# Patient Record
Sex: FEMALE | Race: WHITE | NOT HISPANIC OR LATINO | Employment: OTHER | URBAN - METROPOLITAN AREA
[De-identification: names, ages, dates, MRNs, and addresses within clinical notes are randomized per-mention and may not be internally consistent; named-entity substitution may affect disease eponyms.]

---

## 2017-06-22 DIAGNOSIS — Z51.81 THERAPEUTIC DRUG MONITORING: Primary | ICD-10-CM

## 2017-06-27 ENCOUNTER — LAB (OUTPATIENT)
Dept: LAB | Facility: HOSPITAL | Age: 72
End: 2017-06-27

## 2017-06-27 ENCOUNTER — OFFICE VISIT (OUTPATIENT)
Dept: PALLIATIVE CARE | Facility: CLINIC | Age: 72
End: 2017-06-27

## 2017-06-27 VITALS
DIASTOLIC BLOOD PRESSURE: 59 MMHG | HEIGHT: 65 IN | SYSTOLIC BLOOD PRESSURE: 126 MMHG | BODY MASS INDEX: 24.99 KG/M2 | WEIGHT: 150 LBS | HEART RATE: 71 BPM

## 2017-06-27 DIAGNOSIS — Z51.81 THERAPEUTIC DRUG MONITORING: ICD-10-CM

## 2017-06-27 DIAGNOSIS — C18.9 STAGE IV CARCINOMA OF COLON (HCC): Primary | ICD-10-CM

## 2017-06-27 DIAGNOSIS — G89.3 CANCER ASSOCIATED PAIN: ICD-10-CM

## 2017-06-27 DIAGNOSIS — R10.30 RECURRENT LOWER ABDOMINAL PAIN: ICD-10-CM

## 2017-06-27 DIAGNOSIS — C76.2 ABDOMINAL CARCINOMATOSIS (HCC): ICD-10-CM

## 2017-06-27 DIAGNOSIS — G47.00 INSOMNIA, UNSPECIFIED TYPE: ICD-10-CM

## 2017-06-27 LAB
AMPHET+METHAMPHET UR QL: NEGATIVE
AMPHETAMINES UR QL: NEGATIVE
BARBITURATES UR QL SCN: NEGATIVE
BENZODIAZ UR QL SCN: NEGATIVE
BUPRENORPHINE SERPL-MCNC: NEGATIVE NG/ML
CANNABINOIDS SERPL QL: NEGATIVE
COCAINE UR QL: NEGATIVE
METHADONE UR QL SCN: NEGATIVE
OPIATES UR QL: POSITIVE
OXYCODONE UR QL SCN: NEGATIVE
PCP UR QL SCN: NEGATIVE
PROPOXYPH UR QL: NEGATIVE
TRICYCLICS UR QL SCN: NEGATIVE

## 2017-06-27 PROCEDURE — 99204 OFFICE O/P NEW MOD 45 MIN: CPT | Performed by: INTERNAL MEDICINE

## 2017-06-27 PROCEDURE — 80306 DRUG TEST PRSMV INSTRMNT: CPT | Performed by: INTERNAL MEDICINE

## 2017-06-27 RX ORDER — MONTELUKAST SODIUM 10 MG/1
10 TABLET ORAL NIGHTLY
COMMUNITY

## 2017-06-27 RX ORDER — GUAIFENESIN 600 MG/1
1200 TABLET, EXTENDED RELEASE ORAL 2 TIMES DAILY
COMMUNITY

## 2017-06-27 RX ORDER — HYDROMORPHONE HYDROCHLORIDE 4 MG/1
4 TABLET ORAL
COMMUNITY

## 2017-06-27 RX ORDER — CYCLOBENZAPRINE HCL 10 MG
10 TABLET ORAL 2 TIMES DAILY PRN
COMMUNITY

## 2017-06-27 RX ORDER — PHENOL 1.4 %
1 AEROSOL, SPRAY (ML) MUCOUS MEMBRANE NIGHTLY
Start: 2017-06-27

## 2017-06-27 RX ORDER — PAROXETINE HYDROCHLORIDE 20 MG/1
20 TABLET, FILM COATED ORAL EVERY MORNING
COMMUNITY

## 2017-06-27 RX ORDER — LEVOTHYROXINE SODIUM 0.05 MG/1
50 TABLET ORAL DAILY
COMMUNITY

## 2017-06-27 NOTE — PATIENT INSTRUCTIONS
1.  Take ONE Dilaudid 4mg tablet once in morning, and every 5 hours on a schedule until asleep (total 4 tabs each day) to get a head of pain an avoid times of needing 2 tabs for pain.    2.  On days you will stay home and not be in the car, can just take 1 tablet every 6 hours as needed for pain        ALWAYS bring ALL of your medications prescribed by this clinic to every appointment.    Call [phone] for questions regarding medications, refills, or plan of care on Mondays - Fridays 9am to 4pm.      You must call AT LEAST one week in advance for any new medication or refill requests. Clinic days are Tuesday and Thursdays at this time.  Prescriptions for controlled medications will be completed on clinic days only.    Call after hours and weekends only for acute (not chronic) symptom issues to speak to on-call physician or nurse practitioner.  Be advised that any requests for new prescriptions for controlled substances can NOT be honored after hours.    Call [phone] only for scheduling issues.

## 2017-06-27 NOTE — PROGRESS NOTES
New pt seen with Dr. Tobin. Pt's spouse is with her. Explained team concept and palliative care. Pt is having keytruda treatments. Noted more tiredness following treatment. Spouse notes that pt has been sleeping later. Pt explains she may be more emotional today and her daughter had left to go back to [place] after being here 1 days. She says she does not cry about the cancer, that she keeps a positive outlook. She is realistic about her diagnosis and prognosis and wants people to be straightforward with her. She wants to know what is going on. Discussed hospice as option for stating home at the point where she and her oncologist decide to stop treatment. Pt is very active, sculpts and likes to go out, travel. Discussed management of pain with scheduling her pain medication and she has been waiting to take meds when she begins to hurt. Pt has three adult children of her own and her spouse has one. All the family is willing to help at the end of life.

## 2017-06-27 NOTE — PROGRESS NOTES
Subjective   Melany Sullivan is a 71 y.o. female.     History of Present Illness   71yowf with stage IV colon poorly differentiated carcinoma with multiple mesenteric implants, followed by Dr. Susan Liddle.  Original diagnosis of stage III colon cancer April 2016.  S/p laparotomy and R colectomy.  Difficulty tolerating adjuvant FOLFOX as well as adjuvant Xeloda.  Hospitalized April/May 2017 for recurrent colon cancer with obstruction.  Underwent en bloc resection of small bowel, colon, liver, and abdominal wall, enterotomy repair, and adhesiolysis (Dr. Yoav Robledo).  Now on Keytruda, just approved by insurance.      Pain:  Stinging lower abdominal pain.  Daily  Rates 4/10.  She reports overall high pain tolerance.  Notices it is worse with bending over and car rides.  Applied pressure takes edge off.  She is hesitant to take opioid.  Currently on Dilaudid 4mg tabs, 1-2 every 4 hours PRN.  Dilaudid does allow her to be functional to leave home and tolerate travel, and car for her dogs.  Yesterday, when she stayed home and had daughter perform all chores around house and care for dogs, she only required 2 tabs.  Daily routine - awakens 10am, Has severe pain around 1pm.  Takes Dilaudid 4mg, but has persistent severe pain so takes another 4mg about 30min later.  She states she is able to continue activities, but predictably requires Dilaudid 8mg about 4 hours later.  If she continues activities until late in evening into midnight, she usually takes Dilaudid 4-8mg for increasing pain, in order to rest.  No night awakening due to pain.      Noted was on hydrocodone until this month, but pain not controlled, so was changed to Dilaudid.  Reports improved pain control.  Did have pain awaken her at night when on HCD.      No side effects noted from opioid.  No constipation, due to multiple bowel resections.  Abdominal pain not affected by BMs.  Has usually multiple loose stools each day, without straining.  Occasionally  "formed, but rarely.  She mostly is continent, but will wear pads when out of the home.  No confusion nor sedation noted.    Function:  Cares for dogs.  Enjoys deshaun sculpting and art.  Travels, when able, to visit her 3 daughters.    Fatigue:  Notes since Keytruda, that she is more tired than usual, not sedate, just less energy.  Also noted just transient visual \"waviness\" after first infusion.      GOC:  She understands that plan is lifelong Keytruda, and this is not for curative intent, but to keep pain down and add time.  At end of life, she desires to be in home setting.  She desires \"quality over quantity.\"  Voices she had intolerable SEs from prior cancer therapies.  Unclear re: future surgeries, as they have been helpful as obstructive pain severe.  Questions re: hospice answered.   makes medical decisions when she is unable.   present for today's visit.    The following portions of the patient's history were reviewed and updated as appropriate: allergies, current medications, past family history, past medical history, past social history, past surgical history and problem list.    Review of Systems  Otherwise negative except as below and as already detailed in HPI.    Last BM: daily, loose    ESTEPHANIE-7:  I reviewed. See scanned form.  Screen negative for anxiety  PHQ-9:  I reviewed.  See flowsheet.  Screen negative for depression    PPS/ESAS:  I reviewed.  See flowsheets.  Severe \"tired\".  Mild \"pain\" and \"well-being\".  No SOA, nausea.    KPS: 80 - Normal activity with effort; some signs or symptoms of disease    ECOG: (1) Restricted in physically strenuous activity, ambulatory and able to do work of light nature    Opioid Risk Tool low.  Score 0    UDS: positive for opiate    LOYD/Medication Counts:  Reviewed.  See flowsheets and scanned forms.    Objective   Physical Exam   General:  WNWD, NAD, well-kempt  HEENT:  Anicteric, conjunctiva pink, mucosa moist, EOMI, neck supple  MSK:  Normal tone, " normal gait, good posture except neck stooped forward  PULM:  CTAB, normal effort, no w/r/r  CV:  RRR no m/r/g  GI:  +BS, soft, RLQ tenderness  Ext:  Trace ankle edema b/l  Neuro:  Alert, normal reaction time, good historian, nonfocal, no tremor  Psych:  Normal affect, no anxiety, no agitation, no slowing    Sodium   Date Value Ref Range Status   11/16/2016 144 132 - 146 mmol/L Final     Potassium   Date Value Ref Range Status   11/16/2016 4.4 3.5 - 5.5 mmol/L Final     Chloride   Date Value Ref Range Status   11/16/2016 112 (H) 99 - 109 mmol/L Final     CO2   Date Value Ref Range Status   11/16/2016 20.0 20.0 - 31.0 mmol/L Final     BUN   Date Value Ref Range Status   11/16/2016 28 (H) 9 - 23 mg/dL Final     Creatinine   Date Value Ref Range Status   11/16/2016 1.40 (H) 0.60 - 1.30 mg/dL Final     Glucose   Date Value Ref Range Status   11/16/2016 100 70 - 100 mg/dL Final     Calcium   Date Value Ref Range Status   11/16/2016 9.1 8.7 - 10.4 mg/dL Final       No results found for: BUPRENORSCNU, PROPOXSCN, OXYCODONESCN, BARBITSCNUR, LABMETHSCN, TRICYCLICSCN, LABBENZSCN, AMPHETSCREEN, LABOPIASCN, METAMPSCNUR, COCAINEUR, PCPUR, THCURSCR    Assessment/Plan    1.  Stage IV colon carcinoma  2.  Abdominal carcinomatosis  3.  Cancer associated recurrent lower abdominal pain    Plan (patient instructions):  1.  Take ONE Dilaudid 4mg tablet once in morning, and every 5 hours on a schedule until asleep (total 4 tabs each day) to get a head of pain an avoid times of needing 2 tabs for pain.    2.  On days you will stay home and not be in the car, can just take 1 tablet every 6 hours as needed for pain    Discussion:  We discussed that her total daily dose would decrease if she were to get ahead of her pain.  Therefore, she will take only Dilaudid 4mg four times daily but on a schedule, instead of up to 6 or 8 tabs/day as she has found herself using.  She describes increase in incidental pain due to increased travel and activity  expected with cancer care (infusions, scans, bloodwork).        Patient was counseled on narcotic safety and patient responsibility of medication against theft or stolen medications.  No early refills requests will be honored for lost or stolen medication.  Patient was counseled to take medications only as prescribed or instructed by prescribing physician.  Patient was counseled regarding risk of overdose and polypharmacy.  Patient was advised against using alcohol while on narcotic medication.  Patient was advised of opioid side effects of constipation and potential altered sensorium.  Advised to be supervised for first few days while on new medication or dosages.    Follow up in 3 weeks

## 2017-07-18 ENCOUNTER — OFFICE VISIT (OUTPATIENT)
Dept: PALLIATIVE CARE | Facility: CLINIC | Age: 72
End: 2017-07-18

## 2017-07-18 VITALS
SYSTOLIC BLOOD PRESSURE: 109 MMHG | WEIGHT: 150 LBS | DIASTOLIC BLOOD PRESSURE: 53 MMHG | HEART RATE: 73 BPM | BODY MASS INDEX: 24.96 KG/M2

## 2017-07-18 DIAGNOSIS — F51.01 PRIMARY INSOMNIA: Chronic | ICD-10-CM

## 2017-07-18 DIAGNOSIS — C18.9 STAGE IV CARCINOMA OF COLON (HCC): Primary | ICD-10-CM

## 2017-07-18 DIAGNOSIS — G89.3 CANCER ASSOCIATED PAIN: ICD-10-CM

## 2017-07-18 PROCEDURE — 99214 OFFICE O/P EST MOD 30 MIN: CPT | Performed by: INTERNAL MEDICINE

## 2017-07-18 NOTE — PATIENT INSTRUCTIONS
1.  Dilaudid - no more than 2 tabs at a time.  No more frequent than 6 hours apart.  No more than 5 tabs in a day.    2.  Take Melatonin and Tylenol PM at bedtime for sleep.  Take at least 3 hours after last Dilaudid dose.    3.  We can consider Elavil and/or Temazepam for sleep if insomnia persist.    ALWAYS bring ALL of your medications prescribed by this clinic to every appointment.    Call [phone] for questions regarding medications, refills, or plan of care on Mondays - Fridays 9am to 4pm.      You must call AT LEAST one week in advance for any new medication or refill requests. Clinic days are Tuesday and Thursdays at this time.  Prescriptions for controlled medications will be completed on clinic days only.    Call after hours and weekends only for acute (not chronic) symptom issues to speak to on-call physician or nurse practitioner.  Be advised that any requests for new prescriptions for controlled substances can NOT be honored after hours.    Call [phone] only for scheduling issues.

## 2017-09-19 ENCOUNTER — OFFICE VISIT (OUTPATIENT)
Dept: PALLIATIVE CARE | Facility: CLINIC | Age: 72
End: 2017-09-19

## 2017-09-19 VITALS
WEIGHT: 146 LBS | HEART RATE: 90 BPM | DIASTOLIC BLOOD PRESSURE: 56 MMHG | SYSTOLIC BLOOD PRESSURE: 114 MMHG | BODY MASS INDEX: 24.3 KG/M2

## 2017-09-19 DIAGNOSIS — T40.2X5A CONSTIPATION DUE TO OPIOID THERAPY: ICD-10-CM

## 2017-09-19 DIAGNOSIS — C18.9 STAGE IV CARCINOMA OF COLON (HCC): ICD-10-CM

## 2017-09-19 DIAGNOSIS — L29.9 ITCHING: ICD-10-CM

## 2017-09-19 DIAGNOSIS — G89.3 CANCER ASSOCIATED PAIN: Primary | ICD-10-CM

## 2017-09-19 DIAGNOSIS — C76.2 ABDOMINAL CARCINOMATOSIS (HCC): ICD-10-CM

## 2017-09-19 DIAGNOSIS — R10.30 RECURRENT LOWER ABDOMINAL PAIN: ICD-10-CM

## 2017-09-19 DIAGNOSIS — K59.03 CONSTIPATION DUE TO OPIOID THERAPY: ICD-10-CM

## 2017-09-19 PROCEDURE — 99214 OFFICE O/P EST MOD 30 MIN: CPT | Performed by: INTERNAL MEDICINE

## 2017-09-19 RX ORDER — LINACLOTIDE 290 UG/1
CAPSULE, GELATIN COATED ORAL
COMMUNITY
Start: 2017-08-02 | End: 2017-09-19 | Stop reason: SDUPTHER

## 2017-09-19 RX ORDER — LEVETIRACETAM 500 MG/1
TABLET ORAL
COMMUNITY
Start: 2017-09-04

## 2017-09-19 RX ORDER — HYDROMORPHONE HYDROCHLORIDE 8 MG/1
TABLET ORAL
COMMUNITY
Start: 2017-08-23

## 2017-09-19 RX ORDER — CETIRIZINE HYDROCHLORIDE 10 MG/1
10 TABLET ORAL 2 TIMES DAILY
Qty: 60 TABLET | Refills: 0 | Status: SHIPPED | OUTPATIENT
Start: 2017-09-19

## 2017-09-19 RX ORDER — PROMETHAZINE HYDROCHLORIDE 25 MG/1
TABLET ORAL
COMMUNITY
Start: 2017-08-02

## 2017-09-19 RX ORDER — MORPHINE SULFATE 30 MG/1
30 TABLET, FILM COATED, EXTENDED RELEASE ORAL EVERY 12 HOURS SCHEDULED
Qty: 20 TABLET | Refills: 0 | Status: SHIPPED | OUTPATIENT
Start: 2017-09-19 | End: 2017-09-29

## 2017-09-19 RX ORDER — SENNA PLUS 8.6 MG/1
2 TABLET ORAL DAILY PRN
Qty: 60 TABLET | Refills: 3 | Status: SHIPPED | OUTPATIENT
Start: 2017-09-19

## 2017-09-19 RX ORDER — LORAZEPAM 1 MG/1
TABLET ORAL
COMMUNITY
Start: 2017-08-23 | End: 2017-09-19

## 2017-09-19 RX ORDER — LINACLOTIDE 290 UG/1
290 CAPSULE, GELATIN COATED ORAL
Qty: 30 CAPSULE | Refills: 4 | Status: SHIPPED | OUTPATIENT
Start: 2017-09-19

## 2017-09-19 NOTE — PROGRESS NOTES
Kimberly Sullivan is a 72 y.o. female.     History of Present Illness   72 yowf with stage IV colon poorly differentiated carcinoma with multiple mesenteric implants, followed by Dr. Susan Liddle.  Original diagnosis of stage III colon cancer April 2016.  S/p laparotomy and R colectomy.  Difficulty tolerating adjuvant FOLFOX as well as adjuvant Xeloda.  Hospitalized April/May 2017 for recurrent colon cancer with obstruction.  Underwent en bloc resection of small bowel, colon, liver, and abdominal wall, enterotomy repair, and adhesiolysis (Dr. Yoav Robledo).  Treated with Keytruda.    Interim history:  Suffered seizure.  She and Dr. Liddle decided to stop Keytruda if theoretically contributing.  Scan showed no obvious metastatic disease.  Seen at [facility] ED 8/30/17 for confusion.    Pain: Continuous stinging generalized abdominal pain.  Now taking Dilaudid 12mg every 4 hours, per Dr. Liddle instructions. Pain control at best 5/10 within 1-2 hours after dose, but still limits mobility.  While out shopping in past week, she had to sit down and double over due to pain.      Medication management:  Has system of premade Ziploc bags of medication she keeps in drawer, away from cats, with meds and times written on them.      Symptoms: Denies constipation - on Linzess.  Some positional dizziness if standing up from leaning over.  Unstable gait of generalized weakness at times.  No focal deficits.  Headache a few weeks ago.  No SOA.  No nausea, vomiting.  No urine retention.      Function/Activities: Still ambulatory.  Home alone during daytime.  Leaves home for appointments and shopping.      Mood/Support/Distress: Denies anxiety, depression.   works.  Daughter from [place] here with her today and will be here for a few weeks.  Daughter lives in [place].  Daughter lives in [place].  Not taking Lorazepam (noted prescribed 8/23/17 by Dr. Liddle).    ACP/Goals: Goal is stay out of hospital.  Symptom management.  Does  not want to pursue radiation, which was discussed by Dr. Liddle, per patient, at last appointment.  Hospice minded and asked about hospice support and transition to hospice care.    The following portions of the patient's history were reviewed and updated as appropriate: allergies, current medications, past family history, past medical history, past social history, past surgical history and problem list.    Review of Systems  Otherwise negative except as below and as already detailed in HPI.    LOYD:  Reviewed.  See flowsheets and scanned forms. No concerns.  Consistent with history.  Prescribers identified as members of care team.     Medication Counts:  Reviewed.  See flowsheets and scanned forms. Brought medication.  No overuse or misuse evident.    Opioid Risk Tool:  Low Risk    UDS:  THC, Screen, Urine   Date Value Ref Range Status   06/27/2017 Negative Negative Final     Phencyclidine (PCP), Urine   Date Value Ref Range Status   06/27/2017 Negative Negative Final     Cocaine Screen, Urine   Date Value Ref Range Status   06/27/2017 Negative Negative Final     Methamphetamine, Urine   Date Value Ref Range Status   06/27/2017 Negative Negative Final     Opiate Screen   Date Value Ref Range Status   06/27/2017 Positive (A) Negative Final     Amphetamine Screen, Urine   Date Value Ref Range Status   06/27/2017 Negative Negative Final     Benzodiazepine Screen, Urine   Date Value Ref Range Status   06/27/2017 Negative Negative Final     Tricyclic Antidepressants Screen   Date Value Ref Range Status   06/27/2017 Negative Negative Final     Methadone Screen, Urine   Date Value Ref Range Status   06/27/2017 Negative Negative Final     Barbiturates Screen, Urine   Date Value Ref Range Status   06/27/2017 Negative Negative Final     Oxycodone Screen, Urine   Date Value Ref Range Status   06/27/2017 Negative Negative Final     Propoxyphene Screen   Date Value Ref Range Status   06/27/2017 Negative Negative Final      Buprenorphine, Screen, Urine   Date Value Ref Range Status   06/27/2017 Negative Negative Final     Palliative Performance Scale  Palliative Performance Scale Score: 70%    [place] Symptom Assessment System Revised  Pain Score: 8  Tiredness Score: 8  Nausea Score: 8  Depression Score: No depression  Anxiety Score: No anxiety  Drowsiness Score: No drowsiness  Lack of Appetite Score: 9  Wellbeing Score: 8  Dyspnea Score: No shortness of breath  Other Problem Score: Best possible response  Source of Information: patient    ESTEPHANIE-7:  I reviewed. See scanned form.  Negative screen 3/21    PHQ-2/PHQ-9 Depression Screening 9/19/2017   Little interest or pleasure in doing things 0   Feeling down, depressed, or hopeless 0   Trouble falling or staying asleep, or sleeping too much 2   Feeling tired or having little energy 3   Poor appetite or overeating 3   Feeling bad about yourself - or that you are a failure or have let yourself or your family down 0   Trouble concentrating on things, such as reading the newspaper or watching television 0   Moving or speaking so slowly that other people could have noticed. Or the opposite - being so fidgety or restless that you have been moving around a lot more than usual 0   Thoughts that you would be better off dead, or of hurting yourself in some way 0   Total Score 8   If you checked off any problems, how difficult have these problems made it for you to do your work, take care of things at home, or get along with other people? Not difficult at all        KPS: 70 - Cares for self; unable to carry on normal activity or do normal work     ECOG: (2) Ambulatory and capable of self care, unable to carry out work activity, up and about > 50% or waking hours    Objective   Physical Exam   Constitutional: She is oriented to person, place, and time. She appears well-developed and well-nourished. No distress.   HENT:   Mouth/Throat: No oropharyngeal exudate.   Eyes: Conjunctivae and EOM are  normal. Right eye exhibits no discharge. Left eye exhibits no discharge. No scleral icterus.   Unequal pupils (chronic)   Neck: Neck supple. No tracheal deviation present. No thyromegaly present.   Cardiovascular: Normal rate, regular rhythm and normal heart sounds.  Exam reveals no gallop and no friction rub.    No murmur heard.  Pulmonary/Chest: Effort normal and breath sounds normal. No stridor. No respiratory distress. She has no wheezes. She has no rales.   Abdominal: Soft. Bowel sounds are normal. She exhibits no distension. There is no hepatosplenomegaly. There is tenderness in the right lower quadrant, left upper quadrant and left lower quadrant.   Musculoskeletal: Normal range of motion. She exhibits no edema, tenderness or deformity.   Lymphadenopathy:     She has no cervical adenopathy.   Neurological: She is alert and oriented to person, place, and time. She exhibits normal muscle tone. Coordination normal.   Normal reaction time.  Normal time getting up from chair to exam table.  Normal turning.   Skin: Skin is warm and dry. No rash noted. She is not diaphoretic. No erythema. No pallor.   Psychiatric: She has a normal mood and affect. Her behavior is normal. Judgment and thought content normal.   Nursing note and vitals reviewed.        Sodium   Date Value Ref Range Status   11/16/2016 144 132 - 146 mmol/L Final     Potassium   Date Value Ref Range Status   11/16/2016 4.4 3.5 - 5.5 mmol/L Final     Chloride   Date Value Ref Range Status   11/16/2016 112 (H) 99 - 109 mmol/L Final     CO2   Date Value Ref Range Status   11/16/2016 20.0 20.0 - 31.0 mmol/L Final     BUN   Date Value Ref Range Status   11/16/2016 28 (H) 9 - 23 mg/dL Final     Creatinine   Date Value Ref Range Status   11/16/2016 1.40 (H) 0.60 - 1.30 mg/dL Final     Glucose   Date Value Ref Range Status   11/16/2016 100 70 - 100 mg/dL Final     Calcium   Date Value Ref Range Status   11/16/2016 9.1 8.7 - 10.4 mg/dL Final       Assessment/Plan    Melany was seen today for pain, fatigue, nausea and poor appetite.    Diagnoses and all orders for this visit:    Cancer associated pain  -     Morphine (MS CONTIN) 30 MG 12 hr tablet; Take 1 tablet by mouth Every 12 (Twelve) Hours for 10 days.    Abdominal carcinomatosis    Recurrent lower abdominal pain    Constipation due to opioid therapy  -     LINZESS 290 MCG capsule capsule; Take 1 capsule by mouth Every Morning Before Breakfast.  -     senna (HM SENNA) 8.6 MG tablet; Take 2 tablets by mouth Daily As Needed for Constipation.    Stage IV carcinoma of colon    Itching  -     cetirizine (zyrTEC) 10 MG tablet; Take 1 tablet by mouth 2 (Two) Times a Day.      Patient instructions;  1.  Continue Dilaudid 2-4mg every 3 hours as needed for uncontrolled pain.  2.  Schedule Morphine ER 30mg at 10am and 10pm.  3.  Continue Linzess for constipation.  Can add Senna 2 tablets in the morning as needed for constipation  4.  Schedule Cetirizine twice daily for itching.  Can take Benadryl 25mg every 6 hours as needed for itching.  5.  [facility] - [phone]    Plan:  Start long acting morphine.  Currently on 6 doses Dilaudid 12mg - 72mg Dilaudid = 288mg OME.  With uncontrolled pain, decrease by 25% for cross tolerance, or 72mg OME daily.  Start MSSR 30mg BID.  Premedicate with Zyrtec for anticipated histamine release and itching on morphine.  Noted eGFR 37 on [facility] records, Cr. 1.4.  However, it is easier to adjust morphine at this time vs. Starting Fentanyl.  Continue Dilaudid 2-4mg PRN BTP.  Continue Linzess daily with PRN Senna 2 tabs daily for constipation.      Follow up in 1 week.    Discussion:  Discussed hospice multidisciplinary team support.  She sees Dr. Liddle tomorrow.  Advised that with increase decline and symptom burden, without cancer treatment, and with GOC as above, that she is eligible for hospice from my perspectie.  Of course, Dr. Liddle knows patient better and can make best determination  re: referral timing to hospice.    Advised daughter to use Ativan (1mg tabs prescribed) DC if needed for seizure activity.

## 2017-09-21 ENCOUNTER — TELEPHONE (OUTPATIENT)
Dept: PALLIATIVE CARE | Facility: CLINIC | Age: 72
End: 2017-09-21

## 2017-09-21 NOTE — TELEPHONE ENCOUNTER
Attempted call to pt for follow up on medication changes. No answer both numbers on file; generic voice mail, did not leave message. Next clinic appt 9/26/17.

## 2019-11-01 NOTE — PROGRESS NOTES
"Kimberly Sullivan is a 71 y.o. female.     History of Present Illness   71yowf with stage IV colon poorly differentiated carcinoma with multiple mesenteric implants, followed by Dr. Susan Liddle. Original diagnosis of stage III colon cancer April 2016. S/p laparotomy and R colectomy. Difficulty tolerating adjuvant FOLFOX as well as adjuvant Xeloda. Hospitalized April/May 2017 for recurrent colon cancer with obstruction. Underwent en bloc resection of small bowel, colon, liver, and abdominal wall, enterotomy repair, and adhesiolysis (Dr. Yoav Robledo). Now on Keytruda.    Interim history:  She reports her tumor markers are \"leveling out.\"  Tolerating Keytruda infusions, with predictable shaking during infusion.    Pain:  Daily stinging stabbing continuous lower abdominal pain. woith varying waves of severity.  Last appt, was to schedule Dilaudid 4mg every 5 hours, as she was using Dilaudid 8mg three times daily, in afternoon and early evening, and at bedtime.  However, reports she felt \"dopey\" on the scheduled regimen, so she reverted to going back to previous regimen.  With PRN dosing, she denies needing to lie down due to pain or sedation after taking Dilaudid.  Does not awaken in pain.    Insomnia:  Reports lifelong poor sleep, only sleep 4-6 hrs a night, but feeling rested with that.  Used OTC melatonin or Tylenol PM for years.  Also had a predictable cycle of feeling so wore out by day 3, that she would have long night's rest without a sleep aide.  Over past 2 months, however, noting difficulty falling asleep as well the predictable awakening at 3am.  No ruminating thoughts nor worries.  Not due to pain..  Stopped taking Tylenol PM due to concern re: interaction with Dilaudid.  + snoring per  and witnessed apnea spells for years.  She was diagnosed with sleep apnea but says a neurologist stated she did not have it.  She refuses CPAP.      The following portions of the patient's history were " reviewed and updated as appropriate: allergies, current medications, past family history, past medical history, past social history, past surgical history and problem list.    Review of Systems  Otherwise negative except as below and as already detailed in HPI.    Last BM: every 2-3 days, no straining, blood, nor mucus    ESTEPHANIE-7:  I reviewed. See scanned form.  5/21, minimal axiety  PHQ-9:  I reviewed.  See flowsheet.  Negative depression screen 5/27 (insomnia and tired only)    PPS/ESAS:  I reviewed.  See flowsheets.  Pain goal 3.  Asymptomatic other than sleep/tired.    KPS: 80 - Normal activity with effort; some signs or symptoms of disease    ECOG: (1) Restricted in physically strenuous activity, ambulatory and able to do work of light nature    Opioid Risk Tool low.      LOYD/Medication Counts:  Reviewed.  See flowsheets and scanned forms.  No overuse.  Average 4-5/day.  #206.  LOYD without concerning pattern    Objective   Physical Exam   General:  WNWD, NAD, well-kempt  MSK:  Normal tone, normal gait  HEENT:  Anicteric, EOMI, mucosa moist  PULM:  CTAB, normal effort  CV:  RRR no m/r/g  GI:  +BS, soft, tender RUQ, LUQ, LLQ to moderate palpation, mild bloating  Ext:  No edema  Psych;; normal affect, no slowing nor agitation  Neuro:  Alert, normal reaction time, no tremor, grossly nonfocal    Sodium   Date Value Ref Range Status   11/16/2016 144 132 - 146 mmol/L Final     Potassium   Date Value Ref Range Status   11/16/2016 4.4 3.5 - 5.5 mmol/L Final     Chloride   Date Value Ref Range Status   11/16/2016 112 (H) 99 - 109 mmol/L Final     CO2   Date Value Ref Range Status   11/16/2016 20.0 20.0 - 31.0 mmol/L Final     BUN   Date Value Ref Range Status   11/16/2016 28 (H) 9 - 23 mg/dL Final     Creatinine   Date Value Ref Range Status   11/16/2016 1.40 (H) 0.60 - 1.30 mg/dL Final     Glucose   Date Value Ref Range Status   11/16/2016 100 70 - 100 mg/dL Final     Calcium   Date Value Ref Range Status    11/16/2016 9.1 8.7 - 10.4 mg/dL Final       Lab Results   Component Value Date    BUPRENORSCNU Negative 06/27/2017    PROPOXSCN Negative 06/27/2017    OXYCODONESCN Negative 06/27/2017    BARBITSCNUR Negative 06/27/2017    LABMETHSCN Negative 06/27/2017    TRICYCLICSCN Negative 06/27/2017    LABBENZSCN Negative 06/27/2017    AMPHETSCREEN Negative 06/27/2017    LABOPIASCN Positive (A) 06/27/2017    METAMPSCNUR Negative 06/27/2017    COCAINEUR Negative 06/27/2017    PCPUR Negative 06/27/2017    THCURSCR Negative 06/27/2017       Assessment/Plan    Stage IV colon carcinoma with carcinomatosis on Keytruda    Symptoms:  1.  Visceral abdominal cancer pain, diffuse  2.  Primary insomnia    Patient instructions:  1.  Dilaudid - no more than 2 tabs at a time.  No more frequent than 6 hours apart.  No more than 5 tabs in a day.    2.  Take Melatonin and Tylenol PM at bedtime for sleep.  Take at least 3 hours after last Dilaudid dose.    3.  We can consider Elavil and/or Temazepam for sleep if insomnia persist.    Patient was counseled on narcotic safety and patient responsibility of medication against theft or stolen medications.  No early refills requests will be honored for lost or stolen medication.  Patient was counseled to take medications only as prescribed or instructed by prescribing physician.  Patient was counseled regarding risk of overdose and polypharmacy.  Patient was advised against using alcohol or driving while on narcotic medication.  Patient was advised of opioid side effects of constipation and potential altered sensorium.  Advised of long term effects of sleep apnea, hypogonadism and fatigue, osteoporosis, depression, and risk of abuse and addiction.  Advised to be supervised for first few days while on new medication or dosages.    Discussion:  Advised that goal of scheduling Dilaudid was to avoid increasing opioid demand by waiting for severe pain, requiring more opioid.  However, with her PRN regimen  Jesus Ashley(Attending) at this time, she feels functional, and is not starting to escalate doses needed to abort pain.  If PRN needs increase, will need to revisit scheduling a long acting opioid or scheduling lower dose but more frequent short acting opioid.    Re: sleep, advised to resume OTC melatonin or Tylenol PM, but to take at least 3 hours separate from Dilaudid.  Can consider TCA (which will also address chronic pain centralization) or Temazepam (but higher risks with benzodiazepine/opioid concurrent use).    Follow up 2 months.